# Patient Record
Sex: MALE | Race: BLACK OR AFRICAN AMERICAN | NOT HISPANIC OR LATINO | Employment: OTHER | URBAN - METROPOLITAN AREA
[De-identification: names, ages, dates, MRNs, and addresses within clinical notes are randomized per-mention and may not be internally consistent; named-entity substitution may affect disease eponyms.]

---

## 2020-05-22 RX ORDER — CARVEDILOL 25 MG/1
25 TABLET ORAL 2 TIMES DAILY
Qty: 180 TABLET | Refills: 3 | Status: CANCELLED | OUTPATIENT
Start: 2020-05-22

## 2020-08-31 ENCOUNTER — OFFICE VISIT (OUTPATIENT)
Dept: INTERNAL MEDICINE | Facility: CLINIC | Age: 65
End: 2020-08-31
Payer: COMMERCIAL

## 2020-08-31 VITALS
DIASTOLIC BLOOD PRESSURE: 86 MMHG | TEMPERATURE: 99 F | BODY MASS INDEX: 26.57 KG/M2 | HEIGHT: 70 IN | SYSTOLIC BLOOD PRESSURE: 135 MMHG | WEIGHT: 185.63 LBS | HEART RATE: 68 BPM

## 2020-08-31 DIAGNOSIS — R73.03 PREDIABETES: ICD-10-CM

## 2020-08-31 DIAGNOSIS — N40.1 BENIGN PROSTATIC HYPERPLASIA WITH URINARY OBSTRUCTION: ICD-10-CM

## 2020-08-31 DIAGNOSIS — E55.9 VITAMIN D DEFICIENCY, UNSPECIFIED: ICD-10-CM

## 2020-08-31 DIAGNOSIS — N52.9 ED (ERECTILE DYSFUNCTION) OF ORGANIC ORIGIN: ICD-10-CM

## 2020-08-31 DIAGNOSIS — M15.9 POLYARTICULAR OSTEOARTHRITIS: ICD-10-CM

## 2020-08-31 DIAGNOSIS — K58.8 OTHER IRRITABLE BOWEL SYNDROME: ICD-10-CM

## 2020-08-31 DIAGNOSIS — K21.9 GASTRO-ESOPHAGEAL REFLUX DISEASE WITHOUT ESOPHAGITIS: ICD-10-CM

## 2020-08-31 DIAGNOSIS — E29.1 TESTICULAR HYPOFUNCTION: ICD-10-CM

## 2020-08-31 DIAGNOSIS — E78.2 MIXED HYPERLIPIDEMIA: ICD-10-CM

## 2020-08-31 DIAGNOSIS — N13.8 BENIGN PROSTATIC HYPERPLASIA WITH URINARY OBSTRUCTION: ICD-10-CM

## 2020-08-31 DIAGNOSIS — R97.20 ELEVATED PROSTATE SPECIFIC ANTIGEN (PSA): ICD-10-CM

## 2020-08-31 DIAGNOSIS — D63.8 ANEMIA IN OTHER CHRONIC DISEASES CLASSIFIED ELSEWHERE: ICD-10-CM

## 2020-08-31 DIAGNOSIS — I69.254: ICD-10-CM

## 2020-08-31 DIAGNOSIS — I12.9 HYPERTENSIVE CHRONIC KIDNEY DISEASE WITH STAGE 1 THROUGH STAGE 4 CHRONIC KIDNEY DISEASE, OR UNSPECIFIED CHRONIC KIDNEY DISEASE: Primary | ICD-10-CM

## 2020-08-31 DIAGNOSIS — N18.2 CHRONIC KIDNEY DISEASE, STAGE 2 (MILD): ICD-10-CM

## 2020-08-31 PROCEDURE — 99215 OFFICE O/P EST HI 40 MIN: CPT | Mod: S$GLB,,, | Performed by: INTERNAL MEDICINE

## 2020-08-31 PROCEDURE — 99215 PR OFFICE/OUTPT VISIT, EST, LEVL V, 40-54 MIN: ICD-10-PCS | Mod: S$GLB,,, | Performed by: INTERNAL MEDICINE

## 2020-08-31 RX ORDER — CLOPIDOGREL BISULFATE 75 MG/1
TABLET ORAL
COMMUNITY
Start: 2020-06-10 | End: 2020-09-14

## 2020-08-31 RX ORDER — MELOXICAM 7.5 MG/1
TABLET ORAL
COMMUNITY
Start: 2020-07-24

## 2020-08-31 RX ORDER — TAMSULOSIN HYDROCHLORIDE 0.4 MG/1
CAPSULE ORAL
COMMUNITY
Start: 2020-07-26

## 2020-08-31 RX ORDER — ERGOCALCIFEROL 1.25 MG/1
CAPSULE ORAL
COMMUNITY
Start: 2020-07-17

## 2020-08-31 RX ORDER — SILDENAFIL 100 MG/1
TABLET, FILM COATED ORAL
COMMUNITY
Start: 2020-08-19

## 2020-08-31 NOTE — PROGRESS NOTES
Chief C/o:    Chronic Kidney Disease (Lab results check.), Hypertension, Hyperlipidemia, and Hypokalemia        Health Care Maintenance    Health Maintenance       Date Due Completion Date    Hepatitis C Screening 1955 ---    TETANUS VACCINE 07/01/1972 ---    Shingles Vaccine (1 of 2) 07/01/2004 ---    Pneumococcal Vaccine (65+ Low/Medium Risk) (2 of 2 - PPSV23) 07/01/2019 7/18/2012    Influenza Vaccine (1) 09/01/2020 ---    Colorectal Cancer Screening 01/15/2025 1/15/2015 (Done)    Override on 1/15/2015: Done    Lipid Panel 04/28/2025 4/28/2020                 HISTORY OF PRESENT ILLNESS:    JADE Horn is a 65 y.o. male who has a past medical history significant for hypertension with chronic kidney disease stage 2 at baseline, he has mixed hyperlipidemia, vitamin-D deficiency, prediabetes, gastroesophageal reflux disease, and other medical problem as will be listed in the final diagnosis.  Patient was not seen and about 5 months or more, essentially because of the COVID-19 epidemic, he was doing very well, he did have any problem nor anybody of his immediate family.    Patient feels well in general, he does not have chest pain, shortness of breath, nausea or vomiting, fever or chills.  He has no problem with his medications as well.    He had right foot pain and it in the Emergency on July 24, 2020, it was [facility], the records were reviewed, he had an x-ray at that time, the x-ray revealed no sign of obvious bony deformity, there was mild underlying arthritis changes.  Since then is the pain in the right foot was resolved..    He was also seen by his urologist on August 26th 2020, his problem with his prostate, BPH with obstruction, nocturia, incomplete bladder emptying.  History me that the urologist suggested how surgery, however he is at this time  It is still thinking about it, he feels he is doing well at his current situation and that his intention is to wait for now and may  consider it in the future.          ALLERGIES AND MEDICATIONS: updated and reviewed.  Review of patient's allergies indicates:   Allergen Reactions    Ace inhibitors Swelling    Gabapentin      Medication List with Changes/Refills   Current Medications    AMLODIPINE (NORVASC) 10 MG TABLET    TAKE 1 TABLET BY MOUTH EVERY DAY, REPLACING 5 MG( HOLD FOR SYSTOLIC BLOOD PRESSURE< 120)    ATORVASTATIN (LIPITOR) 20 MG TABLET        CARVEDILOL (COREG) 25 MG TABLET        CLOPIDOGREL (PLAVIX) 75 MG TABLET        ERGOCALCIFEROL (ERGOCALCIFEROL) 50,000 UNIT CAP    TK ONE C PO ONCE A MONTH    HYDRALAZINE (APRESOLINE) 50 MG TABLET        MELOXICAM (MOBIC) 7.5 MG TABLET    TK 1 T PO D PRF PAIN    POTASSIUM CHLORIDE (K-TAB) 20 MEQ    TAKE 1 TABLET BY MOUTH TWICE DAILY AS OF 11/14/18    SILDENAFIL (VIAGRA) 100 MG TABLET    Take 1 tablet by mouth as needed for Erectile Dysfunction    TAMSULOSIN (FLOMAX) 0.4 MG CAP    TK 1 C PO Q NIGHT   Discontinued Medications    AMLODIPINE (NORVASC) 5 MG TABLET        BETHANECHOL (URECHOLINE) 50 MG TABLET    Take 1 tablet (50 mg total) by mouth 3 (three) times daily.    DOXAZOSIN (CARDURA) 4 MG TABLET    Take 1 tablet (4 mg total) by mouth every evening.    FINASTERIDE (PROSCAR) 5 MG TABLET    Take 1 tablet (5 mg total) by mouth once daily.    HYOSCYAMINE (LEVSIN/SL) 0.125 MG SUBL        L-[id number] 3-35-2 MG TAB        POTASSIUM CHLORIDE SA (K-DUR,KLOR-CON) 20 MEQ TABLET        TADALAFIL (CIALIS) 20 MG TAB    Use one tablet every 36 hours    TRAMADOL (ULTRAM) 50 MG TABLET                 CARE TEAM:    Patient Care Team:  Marcela Walls MD as PCP - General (Internal Medicine)         REVIEW OF SYSTEMS:    Review of Systems   Constitutional: Negative for appetite change, chills, diaphoresis, fatigue, fever and unexpected weight change.   HENT: Negative for congestion, drooling, ear discharge, ear pain, facial swelling, hearing loss, nosebleeds, rhinorrhea, sinus pain, sneezing, sore  "throat, tinnitus, trouble swallowing and voice change.    Eyes: Negative for pain, discharge, redness, itching and visual disturbance.   Respiratory: Negative for cough, choking, chest tightness, shortness of breath, wheezing and stridor.    Cardiovascular: Negative for chest pain, palpitations and leg swelling.   Gastrointestinal: Negative for abdominal distention, abdominal pain, blood in stool, constipation, diarrhea, nausea and vomiting.   Endocrine: Negative for cold intolerance, heat intolerance, polydipsia, polyphagia and polyuria.   Genitourinary: Negative for difficulty urinating, dysuria, flank pain, frequency, hematuria and urgency.   Musculoskeletal: Negative for arthralgias, back pain, gait problem, joint swelling, myalgias, neck pain and neck stiffness.        Patient denies any joint pains at this time.   Skin: Negative for color change, pallor, rash and wound.   Allergic/Immunologic: Negative for environmental allergies, food allergies and immunocompromised state.   Neurological: Positive for weakness (There is a very mild weakness in the left upper and lower extremities). Negative for dizziness, tremors, seizures, syncope, speech difficulty, light-headedness, numbness and headaches.   Hematological: Negative for adenopathy. Does not bruise/bleed easily.   Psychiatric/Behavioral: Negative for agitation, behavioral problems, confusion, decreased concentration, dysphoric mood, hallucinations, sleep disturbance and suicidal ideas. The patient is not nervous/anxious.          PHYSICAL EXAM:    Vitals:    08/31/20 0839   BP: 135/86   Pulse: 68   Temp: 98.5 °F (36.9 °C)     Weight: 84.2 kg (185 lb 10 oz)   Height: 5' 10" (177.8 cm)   Body mass index is 26.63 kg/m².  Vitals:    08/31/20 0839   BP: 135/86   Pulse: 68   Temp: 98.5 °F (36.9 °C)   TempSrc: Temporal   Weight: 84.2 kg (185 lb 10 oz)   Height: 5' 10" (1.778 m)   PainSc: 0-No pain          Physical Exam   Constitutional: He is oriented to person, " place, and time. He appears well-developed and well-nourished.  Non-toxic appearance. He does not appear ill. No distress.   Overweight, cooperative, comfortable, in no gross distress.   HENT:   Head: Normocephalic and atraumatic.   Right Ear: Tympanic membrane, external ear and ear canal normal.   Left Ear: Tympanic membrane, external ear and ear canal normal.   Nose: Nose normal. No rhinorrhea or nasal congestion.   Mouth/Throat: Oropharynx is clear and moist. Mucous membranes are moist. No oropharyngeal exudate or posterior oropharyngeal erythema. Oropharynx is clear.   Eyes: Pupils are equal, round, and reactive to light. Conjunctivae are normal. Right eye exhibits no discharge. Left eye exhibits no discharge. No scleral icterus.   Neck: Normal range of motion. Neck supple. No JVD present. No muscular tenderness present. No neck rigidity. No tracheal deviation present. No thyromegaly present.   Cardiovascular: Normal rate, regular rhythm, normal heart sounds and normal pulses. Exam reveals no gallop and no friction rub.   No murmur heard.  Pulmonary/Chest: Effort normal and breath sounds normal. No stridor. No respiratory distress. He has no wheezes. He has no rhonchi. He has no rales. He exhibits no tenderness.   Abdominal: Soft. Bowel sounds are normal. He exhibits no distension and no mass. There is no abdominal tenderness. There is no rebound and no guarding. No hernia.   Genitourinary:    Genitourinary Comments: No costovertebral angle tenderness.     Musculoskeletal: Normal range of motion.         General: No swelling, tenderness, deformity or signs of injury.      Right lower leg: Edema (Swelling pitting edema bilaterally, he has compression stockings on as well.) present.      Left lower leg: Edema present.   Lymphadenopathy:     He has no cervical adenopathy.   Neurological: He is alert and oriented to person, place, and time. He displays weakness (Mild weakness left upper and lower extremities). He  displays normal reflexes. No cranial nerve deficit or sensory deficit. He exhibits normal muscle tone. Gait abnormal. Coordination normal.   Mild weakness of left upper and lower extremities, with mildly abnormal gait because of left leg weakness   Skin: Skin is warm and dry. Capillary refill takes less than 2 seconds. No bruising, no lesion and no rash noted. He is not diaphoretic. No erythema. No jaundice or pallor.   Psychiatric: His behavior is normal. Mood, judgment and thought content normal.   Nursing note and vitals reviewed.         Labs:  Latest labs 4/28/2020, at [facility] were reciwed and discussed with patient.  Lab Results   Component Value Date    HGBA1C 5.6 04/28/2020    ESTIMATEDAVG 114 04/28/2020          ASSESSMENT & PLAN:    1. Hypertensive chronic kidney disease with stage 1 through stage 4 chronic kidney disease.  Blood pressure is not optimally controlled but accepted level at this time, and will keep patient on his current medication including amlodipine 10 mg a day, carvedilol 25 mg twice a day and hydralazine mg tablets.  General measures: low salt, low fat, vegetables and fruits rich diet. Check BP before taking BP medications and follow precautions and guidelines. Keep a records of your BP and Pulse readings and bring the chart to the office next visit. If BP is consistently above 130/80, I recommend that you book a sooner appointment to address the issue.    3. Mixed hyperlipidemia  Lipids were fairly well controlled last checked.  Continue on Atorvastatin 20 mg QHS, plus diet and exercise and weight loss.    4. Testicular hypofunction    5. Vitamin D deficiency, unspecified  On 2000 unites Vit D3 po / OTC. Continue same dose.  6. Prediabetes  Patient's blood sugar is in the range of prediabetes, as well as the hemoglobin A1c.  Diet, exercise and weight loss were encouraged to prevent progression to full-blown diabetes mellitus    7. Gastro-esophageal reflux disease without  esophagitis  Asymptomatic currently.  8. Benign prostatic hyperplasia with urinary obstruction  On flomax, continue same.  Still has symptoms, but he is managing well.  Seeing Urologist, as mentioned above.  9. Other irritable bowel syndrome  Asymptomatic currently.  10. 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  11. Anemia in other chronic diseases classified elsewhere  Mild, stable.  12. ED (erectile dysfunction) of organic origin    13. BMI 26.0-26.9,adult  Low-fat diet, increase vegetables, learn how to count calories, check weight every morning and keep a diet and weight diary.  Bring the diary next visit.  Try to identify one specific food item or habit that you can improve, try to stick to it and add another item after 2-4 weeks interval. If you are not improving as you wish, bring your food diary and we will try, together, find something specific that we can work on.    14. Elevated prostate specific antigen (PSA)  Continue FU with Urologist, no clinical evidence of cancer.  15. Polyarticular osteoarthritis   asymptomatic currently.      Patient is managing very well in general, he will need BL tests before next visit, however, his insurance will be changed, he will contact us before his next appoitment for blood tests orders.      No orders of the defined types were placed in this encounter.     Follow up in about 3 months (around 11/30/2020). or sooner as needed.    Patient was counseled and questions and concerns were addressed.    Please note:  Parts of this report were done using a dictation software, voice to text, and sometimes the text contains some uncorrected words or sentences that are missed during revision.

## 2020-09-14 ENCOUNTER — OFFICE VISIT (OUTPATIENT)
Dept: INTERNAL MEDICINE | Facility: CLINIC | Age: 65
End: 2020-09-14
Payer: MEDICARE

## 2020-09-14 DIAGNOSIS — I12.9 HYPERTENSIVE CHRONIC KIDNEY DISEASE WITH STAGE 1 THROUGH STAGE 4 CHRONIC KIDNEY DISEASE, OR UNSPECIFIED CHRONIC KIDNEY DISEASE: Primary | ICD-10-CM

## 2020-09-14 PROCEDURE — 90694 VACC AIIV4 NO PRSRV 0.5ML IM: CPT | Mod: S$GLB,,, | Performed by: INTERNAL MEDICINE

## 2020-09-14 PROCEDURE — G0008 ADMIN INFLUENZA VIRUS VAC: HCPCS | Mod: S$GLB,,, | Performed by: INTERNAL MEDICINE

## 2020-09-14 PROCEDURE — 90694 FLU VACCINE - QUADRIVALENT - ADJUVANTED: ICD-10-PCS | Mod: S$GLB,,, | Performed by: INTERNAL MEDICINE

## 2020-09-14 PROCEDURE — 99499 NO LOS: ICD-10-PCS | Mod: S$GLB,,, | Performed by: INTERNAL MEDICINE

## 2020-09-14 PROCEDURE — G0008 PR ADMIN INFLUENZA VIRUS VAC: ICD-10-PCS | Mod: S$GLB,,, | Performed by: INTERNAL MEDICINE

## 2020-09-14 PROCEDURE — 99499 UNLISTED E&M SERVICE: CPT | Mod: S$GLB,,, | Performed by: INTERNAL MEDICINE

## 2020-11-09 ENCOUNTER — OFFICE VISIT (OUTPATIENT)
Dept: INTERNAL MEDICINE | Facility: CLINIC | Age: 65
End: 2020-11-09
Payer: MEDICARE

## 2020-11-09 VITALS
WEIGHT: 186.19 LBS | SYSTOLIC BLOOD PRESSURE: 132 MMHG | DIASTOLIC BLOOD PRESSURE: 82 MMHG | BODY MASS INDEX: 26.65 KG/M2 | HEART RATE: 60 BPM | TEMPERATURE: 99 F | HEIGHT: 70 IN

## 2020-11-09 DIAGNOSIS — K21.9 GASTRO-ESOPHAGEAL REFLUX DISEASE WITHOUT ESOPHAGITIS: ICD-10-CM

## 2020-11-09 DIAGNOSIS — I69.359 HISTORY OF HEMORRHAGIC STROKE WITH RESIDUAL HEMIPARESIS: ICD-10-CM

## 2020-11-09 DIAGNOSIS — E29.1 TESTICULAR HYPOFUNCTION: ICD-10-CM

## 2020-11-09 DIAGNOSIS — N13.8 BENIGN PROSTATIC HYPERPLASIA WITH URINARY OBSTRUCTION: ICD-10-CM

## 2020-11-09 DIAGNOSIS — I69.254: ICD-10-CM

## 2020-11-09 DIAGNOSIS — M15.9 POLYARTICULAR OSTEOARTHRITIS: ICD-10-CM

## 2020-11-09 DIAGNOSIS — D63.8 ANEMIA IN OTHER CHRONIC DISEASES CLASSIFIED ELSEWHERE: ICD-10-CM

## 2020-11-09 DIAGNOSIS — N40.1 BENIGN PROSTATIC HYPERPLASIA WITH URINARY OBSTRUCTION: ICD-10-CM

## 2020-11-09 DIAGNOSIS — N18.2 CHRONIC KIDNEY DISEASE, STAGE 2 (MILD): ICD-10-CM

## 2020-11-09 DIAGNOSIS — I12.9 HYPERTENSIVE CHRONIC KIDNEY DISEASE WITH STAGE 1 THROUGH STAGE 4 CHRONIC KIDNEY DISEASE, OR UNSPECIFIED CHRONIC KIDNEY DISEASE: Primary | ICD-10-CM

## 2020-11-09 DIAGNOSIS — E55.9 VITAMIN D DEFICIENCY, UNSPECIFIED: ICD-10-CM

## 2020-11-09 DIAGNOSIS — E78.2 MIXED HYPERLIPIDEMIA: ICD-10-CM

## 2020-11-09 DIAGNOSIS — R73.03 PREDIABETES: ICD-10-CM

## 2020-11-09 DIAGNOSIS — R97.20 ELEVATED PROSTATE SPECIFIC ANTIGEN (PSA): ICD-10-CM

## 2020-11-09 PROCEDURE — 99214 OFFICE O/P EST MOD 30 MIN: CPT | Mod: S$GLB,,, | Performed by: INTERNAL MEDICINE

## 2020-11-09 PROCEDURE — 99214 PR OFFICE/OUTPT VISIT, EST, LEVL IV, 30-39 MIN: ICD-10-PCS | Mod: S$GLB,,, | Performed by: INTERNAL MEDICINE

## 2020-11-09 RX ORDER — FOLIC ACID 1 MG/1
1 TABLET ORAL DAILY
Qty: 90 TABLET | Refills: 3 | Status: SHIPPED | OUTPATIENT
Start: 2020-11-09 | End: 2021-11-09

## 2020-11-09 RX ORDER — FERROUS SULFATE 325(65) MG
325 TABLET ORAL
Qty: 90 TABLET | Refills: 2 | Status: SHIPPED | OUTPATIENT
Start: 2020-11-09

## 2020-11-09 NOTE — ACP (ADVANCE CARE PLANNING)
"  Advance Care Planning/ ACP was discussed with patient face-to-face.    I initiated the discussion with this patient about ADVANCE CARE PLANNING/ ACP.   The patient was not accompanied by any family member or friend.  The concept of Advance Care Planning/ACP was explained, and patient reverberated understanding.  The patient was informed that participation in this discussion about ACP is absolutely voluntary, and is not mandatory.    Topics explains and discussed: Advance Directive/ " Medical Living Will", Health Care Proxy/ "Health Care Durable Power of , and Do Not Resuscitate/ Do not Intubate.   A package of Educational  material, Forms, and Templates, was given to the patient, as well as online resources addresses.  Patient was informed that 'patients have the right to change their minds at any time".  Patients can call or come in person to the office for help and/ or for questions that may arise. And in case hel is needed for filling any of the forms.    At the end of today's visit, patient:       -will discuss ACP with his wife, he is not sure if he had an advanced directive in the past or not, if he has 1 he will bring it to us.    The Education material that was given to the patient is including:  [place] ADVANCE DIRECTIVE PLANNING FOR HEALTH CARE DECISIONS.      Time spent was less than 30 minutes, about 15 minutes.      "

## 2020-11-09 NOTE — PROGRESS NOTES
Chief C/o:    Chronic Kidney Disease (Lab results check.), Hyperlipidemia, Pre-diabetes, and Vitamin D Deficiency        Health Care Maintenance    Health Maintenance       Date Due Completion Date    Hepatitis C Screening 1955 ---    TETANUS VACCINE 07/01/1972 ---    Shingles Vaccine (1 of 2) 07/01/2004 ---    Pneumococcal Vaccine (65+ Low/Medium Risk) (2 of 2 - PPSV23) 07/01/2019 7/18/2012    Colorectal Cancer Screening 01/15/2025 1/15/2015 (Done)    Override on 1/15/2015: Done    Lipid Panel 04/28/2025 4/28/2020                 HISTORY OF PRESENT ILLNESS:    JADE Horn is a 65 y.o. male who presents to the clinic today for Chronic Kidney Disease (Lab results check.), Hyperlipidemia, Pre-diabetes, and Vitamin D Deficiency  .  Patient is feeling well in general, he has no chest pain, no shortness of breath, no nausea, no fever, no chills.  Patient seen urologist on 08/26/2001 who recommended that patient continue Flomax 0.4 mg as well as Viagra 100 mg p.o. p.r.n..  And arrange for follow-up in 6 months.            ALLERGIES AND MEDICATIONS: updated and reviewed.  Review of patient's allergies indicates:   Allergen Reactions    Ace inhibitors Swelling    Gabapentin      Medication List with Changes/Refills   New Medications    FERROUS SULFATE (FEOSOL) 325 MG (65 MG IRON) TAB TABLET    Take 1 tablet (325 mg total) by mouth daily with breakfast.    FOLIC ACID (FOLVITE) 1 MG TABLET    Take 1 tablet (1 mg total) by mouth once daily.   Current Medications    AMLODIPINE (NORVASC) 10 MG TABLET    TAKE 1 TABLET BY MOUTH EVERY DAY, REPLACING 5 MG( HOLD FOR SYSTOLIC BLOOD PRESSURE< 120)    ATORVASTATIN (LIPITOR) 20 MG TABLET        CARVEDILOL (COREG) 25 MG TABLET        CLOPIDOGREL (PLAVIX) 75 MG TABLET    TAKE 1 TABLET(75 MG) BY MOUTH DAILY    ERGOCALCIFEROL (ERGOCALCIFEROL) 50,000 UNIT CAP    TK ONE C PO ONCE A MONTH    HYDRALAZINE (APRESOLINE) 50 MG TABLET        MELOXICAM (MOBIC) 7.5 MG TABLET    TK 1 T  PO D PRF PAIN    POTASSIUM CHLORIDE (K-TAB) 20 MEQ    TAKE 1 TABLET BY MOUTH TWICE DAILY AS OF 11/14/18    SILDENAFIL (VIAGRA) 100 MG TABLET    Take 1 tablet by mouth as needed for Erectile Dysfunction    TAMSULOSIN (FLOMAX) 0.4 MG CAP    TK 1 C PO Q NIGHT             CARE TEAM:    Patient Care Team:  Marcela Walls MD as PCP - General (Internal Medicine)         REVIEW OF SYSTEMS:    Review of Systems   Constitutional: Negative for appetite change, chills, diaphoresis, fatigue, fever and unexpected weight change.   HENT: Negative for congestion, drooling, ear discharge, ear pain, facial swelling, hearing loss, nosebleeds, rhinorrhea, sinus pain, sneezing, sore throat, tinnitus, trouble swallowing and voice change.    Eyes: Negative for pain, discharge, redness, itching and visual disturbance.   Respiratory: Negative for cough, choking, chest tightness, shortness of breath, wheezing and stridor.    Cardiovascular: Negative for chest pain, palpitations and leg swelling.   Gastrointestinal: Negative for abdominal distention, abdominal pain, blood in stool, constipation, diarrhea, nausea and vomiting.   Endocrine: Negative for cold intolerance, heat intolerance, polydipsia, polyphagia and polyuria.   Genitourinary: Negative for difficulty urinating, dysuria, flank pain, frequency, hematuria and urgency.   Musculoskeletal: Negative for arthralgias, back pain, gait problem, joint swelling, myalgias, neck pain and neck stiffness.        Patient denies any joint pains at this time.   Skin: Negative for color change, pallor, rash and wound.   Allergic/Immunologic: Negative for environmental allergies, food allergies and immunocompromised state.   Neurological: Positive for weakness (There is a very mild weakness in the left upper and lower extremities). Negative for dizziness, tremors, seizures, syncope, speech difficulty, light-headedness, numbness and headaches.   Hematological: Negative for adenopathy. Does not  "bruise/bleed easily.   Psychiatric/Behavioral: Negative for agitation, behavioral problems, confusion, decreased concentration, dysphoric mood, hallucinations, sleep disturbance and suicidal ideas. The patient is not nervous/anxious.          PHYSICAL EXAM:    Vitals:    11/09/20 0819   BP: 132/82   Pulse: 60   Temp: 98.6 °F (37 °C)     Weight: 84.5 kg (186 lb 2.9 oz)   Height: 5' 10" (177.8 cm)   Body mass index is 26.71 kg/m².  Vitals:    11/09/20 0819   BP: 132/82   Pulse: 60   Temp: 98.6 °F (37 °C)   TempSrc: Temporal   Weight: 84.5 kg (186 lb 2.9 oz)   Height: 5' 10" (1.778 m)   PainSc: 0-No pain          Physical Exam   Constitutional: He is oriented to person, place, and time. He appears well-developed and well-nourished.  Non-toxic appearance. He does not appear ill. No distress.   Patient is overweight, cooperative, comfortable, in no gross distress.   HENT:   Head: Normocephalic and atraumatic.   Right Ear: Tympanic membrane, external ear and ear canal normal.   Left Ear: Tympanic membrane, external ear and ear canal normal.   Nose: Nose normal. No rhinorrhea or nasal congestion.   Mouth/Throat: Oropharynx is clear and moist. Mucous membranes are moist. No oropharyngeal exudate or posterior oropharyngeal erythema. Oropharynx is clear.   Eyes: Pupils are equal, round, and reactive to light. Conjunctivae are normal. Right eye exhibits no discharge. Left eye exhibits no discharge. No scleral icterus.   Neck: Normal range of motion. Neck supple. No JVD present. No muscular tenderness present. No neck rigidity. No tracheal deviation present. No thyromegaly present.   Cardiovascular: Normal rate, regular rhythm, normal heart sounds and normal pulses. Exam reveals no gallop and no friction rub.   No murmur heard.  Pulmonary/Chest: Effort normal and breath sounds normal. No stridor. No respiratory distress. He has no wheezes. He has no rhonchi. He has no rales. He exhibits no tenderness.   Abdominal: Soft. Normal " appearance and bowel sounds are normal. He exhibits no distension and no mass. There is no abdominal tenderness. There is no rebound and no guarding. No hernia.   Genitourinary:    Genitourinary Comments: No costovertebral angle tenderness.     Musculoskeletal: Normal range of motion.         General: No swelling, tenderness, deformity or signs of injury.      Right lower leg: Edema (+1 pitting edema bilaterally) present.      Left lower leg: Edema present.      Comments: Crepitation both knees   Lymphadenopathy:     He has no cervical adenopathy.   Neurological: He is alert and oriented to person, place, and time. He displays weakness (Mild weakness left upper and lower extremities). He displays normal reflexes. No cranial nerve deficit or sensory deficit. He exhibits normal muscle tone. Gait abnormal. Coordination normal.   Mild weakness of left upper and lower extremities, with mildly abnormal gait because of left leg weakness   Skin: Skin is warm and dry. Capillary refill takes less than 2 seconds. No bruising, no lesion and no rash noted. He is not diaphoretic. No erythema. No jaundice or pallor.   Psychiatric: His behavior is normal. Mood, judgment and thought content normal.   Nursing note and vitals reviewed.         Labs:  Discussed with patient      Lab Results   Component Value Date    HGBA1C 5.6 04/28/2020    ESTIMATEDAVG 114 04/28/2020      Hemoglobin A1c was 5.4% on 10/26/2020,   iron was 55 which is mildly low.    Ferritin was 11.4 ng per mL which is low.    ASSESSMENT & PLAN:    1. Hypertensive chronic kidney disease with stage 1 through stage 4 chronic kidney disease, or unspecified chronic kidney disease    2. Chronic kidney disease, stage 2 (mild)    3. Prediabetes    4. Mixed hyperlipidemia    5. Anemia in other chronic diseases classified elsewhere  - ferrous sulfate (FEOSOL) 325 mg (65 mg iron) Tab tablet; Take 1 tablet (325 mg total) by mouth daily with breakfast.  Dispense: 90 tablet; Refill:  2  - folic acid (FOLVITE) 1 MG tablet; Take 1 tablet (1 mg total) by mouth once daily.  Dispense: 90 tablet; Refill: 3    6. Hemiplegia of left nondominant side as late effect of other nontraumatic intracranial hemorrhage, unspecified hemiplegia type    7. History of hemorrhagic stroke with residual hemiparesis 2012    8. Benign prostatic hyperplasia with urinary obstruction    9. Elevated prostate specific antigen (PSA)    10. Testicular hypofunction    11. Vitamin D deficiency, unspecified    12. Gastro-esophageal reflux disease without esophagitis    13. BMI 26.0-26.9,adult    14. Polyarticular osteoarthritis     Patient is stable and as baseline currently, medication were reconciled and patient was advised to continue his current medication.  Diet exercise and weight management were encouraged as well.  Order for blood test was done.  Outside records from urologist were reviewed as well.        No orders of the defined types were placed in this encounter.     Follow up in about 1 month (around 12/9/2020). or sooner as needed.    Patient was counseled and questions and concerns were addressed.    Please note:  Parts of this report were done using a dictation software, voice to text, and sometimes the text contains some uncorrected words or sentences that are missed during revision.

## 2020-11-19 RX ORDER — CARVEDILOL 25 MG/1
25 TABLET ORAL 2 TIMES DAILY
Qty: 180 TABLET | Refills: 3 | Status: SHIPPED | OUTPATIENT
Start: 2020-11-19

## 2020-12-15 ENCOUNTER — OFFICE VISIT (OUTPATIENT)
Dept: INTERNAL MEDICINE | Facility: CLINIC | Age: 65
End: 2020-12-15
Payer: MEDICARE

## 2020-12-15 VITALS
HEIGHT: 70 IN | TEMPERATURE: 98 F | HEART RATE: 76 BPM | BODY MASS INDEX: 26.89 KG/M2 | WEIGHT: 187.81 LBS | SYSTOLIC BLOOD PRESSURE: 121 MMHG | DIASTOLIC BLOOD PRESSURE: 76 MMHG

## 2020-12-15 DIAGNOSIS — N13.8 BENIGN PROSTATIC HYPERPLASIA WITH URINARY OBSTRUCTION: ICD-10-CM

## 2020-12-15 DIAGNOSIS — I69.254: ICD-10-CM

## 2020-12-15 DIAGNOSIS — R97.20 ELEVATED PROSTATE SPECIFIC ANTIGEN (PSA): ICD-10-CM

## 2020-12-15 DIAGNOSIS — D63.8 ANEMIA IN OTHER CHRONIC DISEASES CLASSIFIED ELSEWHERE: ICD-10-CM

## 2020-12-15 DIAGNOSIS — N40.1 BENIGN PROSTATIC HYPERPLASIA WITH URINARY OBSTRUCTION: ICD-10-CM

## 2020-12-15 DIAGNOSIS — N18.2 CHRONIC KIDNEY DISEASE, STAGE 2 (MILD): ICD-10-CM

## 2020-12-15 DIAGNOSIS — K21.9 GASTRO-ESOPHAGEAL REFLUX DISEASE WITHOUT ESOPHAGITIS: ICD-10-CM

## 2020-12-15 DIAGNOSIS — E55.9 VITAMIN D DEFICIENCY, UNSPECIFIED: ICD-10-CM

## 2020-12-15 DIAGNOSIS — N52.9 ED (ERECTILE DYSFUNCTION) OF ORGANIC ORIGIN: ICD-10-CM

## 2020-12-15 DIAGNOSIS — E87.6 HYPOKALEMIA: ICD-10-CM

## 2020-12-15 DIAGNOSIS — R33.9 INCOMPLETE BLADDER EMPTYING: ICD-10-CM

## 2020-12-15 DIAGNOSIS — E78.2 MIXED HYPERLIPIDEMIA: ICD-10-CM

## 2020-12-15 DIAGNOSIS — R35.1 NOCTURIA: ICD-10-CM

## 2020-12-15 DIAGNOSIS — E29.1 TESTICULAR HYPOFUNCTION: ICD-10-CM

## 2020-12-15 DIAGNOSIS — D72.819 LEUKOPENIA, UNSPECIFIED TYPE: ICD-10-CM

## 2020-12-15 DIAGNOSIS — I12.9 HYPERTENSIVE CHRONIC KIDNEY DISEASE WITH STAGE 1 THROUGH STAGE 4 CHRONIC KIDNEY DISEASE, OR UNSPECIFIED CHRONIC KIDNEY DISEASE: Primary | ICD-10-CM

## 2020-12-15 DIAGNOSIS — M15.9 POLYARTICULAR OSTEOARTHRITIS: ICD-10-CM

## 2020-12-15 DIAGNOSIS — K58.8 OTHER IRRITABLE BOWEL SYNDROME: ICD-10-CM

## 2020-12-15 DIAGNOSIS — I69.359 HISTORY OF HEMORRHAGIC STROKE WITH RESIDUAL HEMIPARESIS: ICD-10-CM

## 2020-12-15 DIAGNOSIS — R73.03 PREDIABETES: ICD-10-CM

## 2020-12-15 PROCEDURE — G0402 PR WELCOME MEDICARE PREVENTIVE VISIT NEW ENROLLEE: ICD-10-PCS | Mod: S$GLB,,, | Performed by: INTERNAL MEDICINE

## 2020-12-15 PROCEDURE — 99497 PR ADVNCD CARE PLAN 30 MIN: ICD-10-PCS | Mod: 25,S$GLB,, | Performed by: INTERNAL MEDICINE

## 2020-12-15 PROCEDURE — 99213 OFFICE O/P EST LOW 20 MIN: CPT | Mod: 25,S$GLB,, | Performed by: INTERNAL MEDICINE

## 2020-12-15 PROCEDURE — G0402 INITIAL PREVENTIVE EXAM: HCPCS | Mod: S$GLB,,, | Performed by: INTERNAL MEDICINE

## 2020-12-15 PROCEDURE — 99497 ADVNCD CARE PLAN 30 MIN: CPT | Mod: 25,S$GLB,, | Performed by: INTERNAL MEDICINE

## 2020-12-15 PROCEDURE — 99213 PR OFFICE/OUTPT VISIT, EST, LEVL III, 20-29 MIN: ICD-10-PCS | Mod: 25,S$GLB,, | Performed by: INTERNAL MEDICINE

## 2020-12-15 NOTE — ACP (ADVANCE CARE PLANNING)
"  Advance Care Planning/ ACP was discussed with patient face-to-face.    I initiated the discussion with this patient about ADVANCE CARE PLANNING/ ACP.   The patient was not accompanied by any family member or friend.  The concept of Advance Care Planning/ACP was explained, and patient reverberated understanding.  The patient was informed that participation in this discussion about ACP is absolutely voluntary, and is not mandatory.    Topics explains and discussed: Advance Directive/ " Medical Living Will", Health Care Proxy/ "Health Care Durable Power of , and Do Not Resuscitate/ Do not Intubate.   A package of Educational  material, Forms, and Templates, was given to the patient, as well as online resources addresses.  Patient was informed that 'patients have the right to change their minds at any time".  Patients can call or come in person to the office for help and/ or for questions that may arise. And in case hel is needed for filling any of the forms.    At the end of today's visit, patient:     Patient will discuss advance care planning with his wife, and if he completed form, he will bring it to us have a copy in his file.    The Education material that was given to the patient is including:  [place] ADVANCE DIRECTIVE PLANNING FOR HEALTH CARE DECISIONS.      Time spent was less than 30 minutes, pulse 20 minutes.      "

## 2020-12-15 NOTE — PROGRESS NOTES
Chief C/o:    Chronic Kidney Disease (Lab results check.), Hyperlipidemia, Pre-diabetes, and Vitamin D Deficiency        Health Care Maintenance    Health Maintenance       Date Due Completion Date    Hepatitis C Screening 1955 ---    TETANUS VACCINE 07/01/1972 ---    Shingles Vaccine (1 of 2) 07/01/2004 ---    Pneumococcal Vaccine (65+ Low/Medium Risk) (2 of 2 - PPSV23) 07/01/2019 7/18/2012    High Dose Statin 11/09/2021 11/9/2020    Colorectal Cancer Screening 01/15/2025 1/15/2015 (Done)    Override on 1/15/2015: Done    Lipid Panel 04/28/2025 4/28/2020                 HISTORY OF PRESENT ILLNESS:    JADE Horn is a 65 y.o. male who presents to the clinic today for Chronic Kidney Disease (Lab results check.), Hyperlipidemia, Pre-diabetes, and Vitamin D Deficiency  .   Patient feels well in general, with no chest pain, no shortness of breath, no nausea, no fever, no chills.  Patient has no side effects of significance with current medications.                  ALLERGIES AND MEDICATIONS: updated and reviewed.  Review of patient's allergies indicates:   Allergen Reactions    Ace inhibitors Swelling    Gabapentin      Medication List with Changes/Refills   Current Medications    AMLODIPINE (NORVASC) 10 MG TABLET    TAKE 1 TABLET BY MOUTH EVERY DAY, REPLACING 5 MG( HOLD FOR SYSTOLIC BLOOD PRESSURE< 120)    ATORVASTATIN (LIPITOR) 20 MG TABLET        CARVEDILOL (COREG) 25 MG TABLET    Take 1 tablet (25 mg total) by mouth 2 (two) times daily.    CLOPIDOGREL (PLAVIX) 75 MG TABLET    TAKE 1 TABLET(75 MG) BY MOUTH DAILY    ERGOCALCIFEROL (ERGOCALCIFEROL) 50,000 UNIT CAP    TK ONE C PO ONCE A MONTH    FERROUS SULFATE (FEOSOL) 325 MG (65 MG IRON) TAB TABLET    Take 1 tablet (325 mg total) by mouth daily with breakfast.    FOLIC ACID (FOLVITE) 1 MG TABLET    Take 1 tablet (1 mg total) by mouth once daily.    HYDRALAZINE (APRESOLINE) 50 MG TABLET        MELOXICAM (MOBIC) 7.5 MG TABLET    TK 1 T PO D PRF PAIN     POTASSIUM CHLORIDE (K-TAB) 20 MEQ    TAKE 1 TABLET BY MOUTH TWICE DAILY AS OF 11/14/18    SILDENAFIL (VIAGRA) 100 MG TABLET    Take 1 tablet by mouth as needed for Erectile Dysfunction    TAMSULOSIN (FLOMAX) 0.4 MG CAP    TK 1 C PO Q NIGHT             CARE TEAM:    Patient Care Team:  Marcela Walls MD as PCP - General (Internal Medicine)         REVIEW OF SYSTEMS:    Review of Systems   Constitutional: Negative for appetite change, chills, diaphoresis, fatigue, fever and unexpected weight change.   HENT: Negative for congestion, drooling, ear discharge, ear pain, facial swelling, hearing loss, nosebleeds, rhinorrhea, sinus pain, sneezing, sore throat, tinnitus, trouble swallowing and voice change.    Eyes: Negative for pain, discharge, redness, itching and visual disturbance.   Respiratory: Negative for cough, choking, chest tightness, shortness of breath, wheezing and stridor.    Cardiovascular: Negative for chest pain, palpitations and leg swelling.   Gastrointestinal: Negative for abdominal distention, abdominal pain, blood in stool, constipation, diarrhea, nausea and vomiting.   Endocrine: Negative for cold intolerance, heat intolerance, polydipsia, polyphagia and polyuria.   Genitourinary: Negative for difficulty urinating, dysuria, flank pain, frequency, hematuria and urgency.   Musculoskeletal: Negative for arthralgias, back pain, gait problem, joint swelling, myalgias, neck pain and neck stiffness.        Patient denies any joint pains at this time.   Skin: Negative for color change, pallor, rash and wound.   Allergic/Immunologic: Negative for environmental allergies, food allergies and immunocompromised state.   Neurological: Positive for weakness (There is a very mild weakness in the left upper and lower extremities). Negative for dizziness, tremors, seizures, syncope, speech difficulty, light-headedness, numbness and headaches.   Hematological: Negative for adenopathy. Does not bruise/bleed  "easily.   Psychiatric/Behavioral: Negative for agitation, behavioral problems, confusion, decreased concentration, dysphoric mood, hallucinations, sleep disturbance and suicidal ideas. The patient is not nervous/anxious.          PHYSICAL EXAM:    Vitals:    12/15/20 0811   BP: 121/76   Pulse: 76   Temp: 98 °F (36.7 °C)     Weight: 85.2 kg (187 lb 13.3 oz)   Height: 5' 10" (177.8 cm)   Body mass index is 26.95 kg/m².  Vitals:    12/15/20 0811   BP: 121/76   Pulse: 76   Temp: 98 °F (36.7 °C)   TempSrc: Temporal   Weight: 85.2 kg (187 lb 13.3 oz)   Height: 5' 10" (1.778 m)   PainSc: 0-No pain          Physical Exam   Constitutional: He is oriented to person, place, and time. He appears well-developed and well-nourished.  Non-toxic appearance. He does not appear ill. No distress.   Patient is overweight, cooperative, comfortable, in no gross distress.   HENT:   Head: Normocephalic and atraumatic.   Right Ear: Tympanic membrane, external ear and ear canal normal.   Left Ear: Tympanic membrane, external ear and ear canal normal.   Nose: Nose normal. No rhinorrhea or nasal congestion.   Mouth/Throat: Oropharynx is clear and moist. Mucous membranes are moist. No oropharyngeal exudate or posterior oropharyngeal erythema. Oropharynx is clear.   Eyes: Pupils are equal, round, and reactive to light. Conjunctivae are normal. Right eye exhibits no discharge. Left eye exhibits no discharge. No scleral icterus.   Neck: Normal range of motion. Neck supple. No JVD present. No muscular tenderness present. No neck rigidity. No tracheal deviation present. No thyromegaly present.   Cardiovascular: Normal rate, regular rhythm, normal heart sounds and normal pulses. Exam reveals no gallop and no friction rub.   No murmur heard.  Pulmonary/Chest: Effort normal and breath sounds normal. No stridor. No respiratory distress. He has no wheezes. He has no rhonchi. He has no rales. He exhibits no tenderness.   Abdominal: Soft. Normal appearance " and bowel sounds are normal. He exhibits no distension and no mass. There is no abdominal tenderness. There is no rebound and no guarding. No hernia.   Genitourinary:    Genitourinary Comments: No costovertebral angle tenderness.     Musculoskeletal: Normal range of motion.         General: No swelling, tenderness, deformity or signs of injury.      Right lower leg: Edema (+1 pitting edema bilaterally) present.      Left lower leg: Edema present.      Comments: Crepitation both knees   Lymphadenopathy:     He has no cervical adenopathy.   Neurological: He is alert and oriented to person, place, and time. He displays weakness (Mild weakness left upper and lower extremities). He displays normal reflexes. No cranial nerve deficit or sensory deficit. He exhibits normal muscle tone. Gait abnormal. Coordination normal.   Mild weakness of left upper and lower extremities, with mildly abnormal gait because of left leg weakness   Skin: Skin is warm and dry. Capillary refill takes less than 2 seconds. No bruising, no lesion and no rash noted. He is not diaphoretic. No erythema. No jaundice or pallor.   Psychiatric: His behavior is normal. Mood, judgment and thought content normal.   Nursing note and vitals reviewed.         Labs:   patient had  His blood test at done at  on 12/09/2020.    His ferritin was decreased to 17.4, normal is  ng/mL.  Iron was decreased to 59, normal between  mcg/dl.   hepatitis-C was negative.    Potassium was decreased to 3.4.    Estimated GFR was 70.   HDL was 49, triglyceride 125, and LDL was 95. TSH was normal.    WBCs were decreased to 4.3 K and hemoglobin was mildly decreased at 12.4 with lower limit of normal at 13.     note a copy of patient's blood test is in the media section.    ASSESSMENT & PLAN:    1. Hypertensive chronic kidney disease with stage 1 through stage 4 chronic kidney disease, or unspecified chronic kidney disease   blood pressure is  well controlled, continue current medications in addition to diet exercise and weight loss.  General measures: low salt, low fat, vegetables and fruits rich diet. Check BP before taking BP medications and follow precautions and guidelines. Keep a records of your BP and Pulse readings and bring the chart to the office next visit. If BP is consistently above 130/80, I recommend that you book a sooner appointment to address the issue.  2. Chronic kidney disease, stage 2 (mild)    Mild chronic kidney disease, patient is stable over the past several years, he also sees nephrologist once a year.  3. Hypokalemia    Mild hypokalemia with potassium at 3.4, patient to continue on potassium chloride 20 mEq twice a day, will recheck his BMP, magnesium, and potassium in about 1 month, nonfasting.  4. Mixed hyperlipidemia    Fairly well controlled  5. 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 Prediabetes  Patient's blood sugar is in the range of prediabetes, as well as the hemoglobin A1c.  Diet, exercise and weight loss were encouraged to prevent progression to full-blown diabetes mellitus  7. Anemia in other chronic diseases classified elsewhere   on iron supplement, level of hemoglobin is consistent with mild anemia.  8. Benign prostatic hyperplasia with urinary obstruction    Sees urologist once a year continue current medications  9. Elevated prostate specific antigen (PSA)   as above  10. Nocturia    11. Incomplete bladder emptying    12. BMI 26.0-26.9,adult  Low-fat diet, increase vegetables, learn how to count calories, check weight every morning and keep a diet and weight diary.  Bring the diary next visit.  Try to identify one specific food item or habit that you can improve, try to stick to it and add another item after 2-4 weeks interval. If you are not improving as you wish, bring your food diary and we will try, together,  find something specific that we can work on.  13. ED (erectile dysfunction) of organic origin    14. Testicular hypofunction    15. Vitamin D deficiency, unspecified   continue on some blunt, vitamin-D 2.  16. Gastro-esophageal reflux disease without esophagitis   asymptomatic currently  17. Other irritable bowel syndrome    18. Polyarticular osteoarthritis    19. Leukopenia, unspecified type   no clinical implication at this time.  20. History of hemorrhagic stroke with residual hemiparesis 2012         Will see the patient again in about 1 month time after a blood test, non-fasting,  To check his BMP, potassium, magnesium, and phosphorous.     Note that a wellness visit was combined with this visit, and papers are scanned to media section.    Advance care planning was discussed with the patient at length, and patient is having intention to discuss it with his wife and will bring us a copy of his advanced directive if he completed it.    No orders of the defined types were placed in this encounter.     Follow up in about 1 month (around 1/15/2021). or sooner as needed.    Patient was counseled and questions and concerns were addressed.    Please note:  Parts of this report were done using a dictation software, voice to text, and sometimes the text contains some uncorrected words or sentences that are missed during revision.

## 2020-12-15 NOTE — PATIENT INSTRUCTIONS
Prevention Guidelines, Men Ages 65 and Older  Screening tests and vaccines are an important part of managing your health. Health counseling is essential, too. Below are guidelines for these, for men ages 65 and older. Talk with your healthcare provider to make sure youre up-to-date on what you need.  Screening Who needs it How often   Abdominal aortic aneurysm Men ages 65 to 75 who have ever smoked 1 ultrasound   Alcohol misuse All men in this age group At routine exams   Blood pressure All men in this age group Every 2 years if your blood pressure is less than 120/80 mm Hg; yearly if your systolic blood pressure is 120 to 139 mm Hg, or your diastolic blood pressure reading is 80 to 89 mm Hg   Colorectal cancer All men in this age group Flexible sigmoidoscopy every 5 years, or colonoscopy every 10 years, or double-contrast barium enema every 5 years; yearly fecal occult blood test or fecal immunochemical test; or a stool DNA test as often as your healthcare provider advises; talk with your healthcare provider about which tests are best for you and when you no longer need colonoscopies (generally after age 75)   Depression All men in this age group At routine exams   Type 2 diabetes or prediabetes All adults beginning at age 45 and adults without symptoms at any age who are overweight or obese and have 1 or more other risk factors for diabetes At least every 3 years (yearly if your blood sugar has already begun to rise)   Hepatitis C Men at increased risk for infection - talk with your healthcare provider At routine exams   High cholesterol or triglycerides All men in this age group At least every 5 years   HIV Men at increased risk for infection - talk with your healthcare provider At routine exams   Lung cancer Adults ages 55 to 80 who have smoked Yearly screening in smokers with 30 pack-year history of smoking or who quit within 15 years   Obesity All men in this age group At routine exams   Prostate cancer All  men in this age group, talk to healthcare provider about risks and benefits of digital rectal exam (LORNE) and prostate-specific antigen (PSA) screening1 At routine exams   Syphilis Men at increased risk for infection - talk with your healthcare provider At routine exams   Tuberculosis Men at increased risk for infection - talk with your healthcare provider Ask your healthcare provider   Vision All men in this age group Every 1 to 2 years; if you have a chronic health condition, ask your healthcare provider if you needs exams more often   Vaccine Who needs it How often   Chickenpox (varicella) All men in this age group who have no record of this infection or vaccine 2 doses; second dose should be given at least 4 weeks after the first dose   Hepatitis A Men at increased risk for infection - talk with your healthcare provider 2 doses given at least 6 months apart   Hepatitis B Men at increased risk for infection - talk with your healthcare provider 3 doses over 6 months; second dose should be given 1 month after the first dose; the third dose should be given at least 2 months after the second dose and at least 4 months after the first dose   Haemophilus influenzae Type B (HIB) Men at increased risk for infection - talk with your healthcare provider 1 to 3 doses   Influenza (flu) All men in this age group  Once a year   Meningococcal Men at increased risk for infection - talk with your healthcare provider 1 or more doses   Pneumococcal conjugate vaccine (PCV13) and pneumococcal polysaccharide vaccine (PPSV23) All men in this age group 1 dose of each vaccine   Tetanus/diphtheria/  pertussis (Td/Tdap) booster All men in this age group Td every 10 years, or Tdap if you will have contact with a child younger than 12 months old   Zoster All men in this age group 1 dose   Counseling Who needs it How often   Diet and exercise Men who are overweight or obese When diagnosed, and then at routine exams   Fall prevention (exercise,  vitamin D supplements) All men in this age group At routine exams   Sexually transmitted infection Men at increased risk for infection - talk with your healthcare provider At routine exams   Use of daily aspirin Men ages 45 to 79 at risk for cardiovascular health problems At routine exams   Use of tobacco and the health effects it can cause All men in this age group Every visit   [address] [place] [address] [facility]   Date Last Reviewed: 2/1/2017  © 5611-0542 [employer], [employer]. [address] [place] [address], [place] [address]. All rights reserved. This information is not intended as a substitute for professional medical care. Always follow your healthcare professional's instructions.        Understanding Advance Care Planning  Advance care planning is the process of deciding ones own future medical care. It helps to make sure that if you cant speak for yourself, your wishes can still be carried out. The plan is a series of legal documents that note a persons wishes. The documents vary by state. Advance care planning should be discussed at a regular office visit with your primary care provider before an acute illness. Advance care planning is encouraged when a person has a serious illness that is expected to get worse. It may also be done before major surgery. And it can help you and your family be prepared in case of a major illness or injury. Advance care planning helps with making decisions at these times.    A healthcare proxy is a person who acts as the voice of a patient when the patient cant speak for himself or herself. The name of this role varies by state. It may be called a Durable Medical Power of  or Durable Power of  for Healthcare. It may be called an agent, surrogate, or advocate. Or it may be called a representative or decision maker. It is an official duty that is identified by a legal document. The document also varies by state.   Why is advance care planning important?  If a  person communicates his or her healthcare wishes:  · He or she will be given medical care that matches his or her values and goals.  · Family members will not be forced to make decisions in a crisis with no guidance.  Creating a plan  Making an advance care plan is often done in 3 steps:  · Thinking about ones wishes. To create an advance care plan, you should think about what kind of medical treatment you would want if you lose the ability to communicate. Are there any situations in which you would refuse or stop treatment? Are there therapies you would want or not want? And whom do you want to make decisions for you? There are many places to learn more about how to plan for your care. Ask your healthcare provider or  for resources.  · Picking a healthcare proxy. This means choosing a trusted person to speak for you only when you cant speak for yourself. When you cannot make medical decisions, your proxy makes sure the instructions in your advance care plan are followed. A proxy does not make decisions based on his or her own opinions. They must put aside those opinions and values if needed, and carry out your wishes.  · Filling out the legal documents. There are several kinds of legal documents for advance care planning. Each one tells healthcare providers your wishes. The documents may vary by state. They must be signed and may need to be witnessed or notarized. You can cancel or change them whenever you wish. Depending on your state, the documents may include a Healthcare Proxy form, Living Will, Durable Medical Power of , Advance Directive, or others.  The familys role  The best help a family can give is to support their loved ones wishes. Open and honest communication is vital. Family should express any concerns they have about the patients choices while the patient can still make decisions in the event that his or her illness prevents him or her from communicating those wishes at a  later time.   Date Last Reviewed: 4/1/2017  © 3053-6125 [employer], [employer]. [address] [place] [address], [place] [address]. All rights reserved. This information is not intended as a substitute for professional medical care. Always follow your healthcare professional's instructions.

## 2020-12-15 NOTE — PROGRESS NOTES
"  Boris Horn presented for a follow-up Medicare AWV today. The following components were reviewed and updated:    · Medical history  · Family History  · Social history  · Allergies and Current Medications  · Health Risk Assessment  · Health Maintenance  · Care Team    **See Completed Assessments for Annual Wellness visit with in the encounter summary    The following assessments were completed:  · Depression Screening  · Cognitive function Screening  · Timed Get  Up Test  ·     Vitals:    12/15/20 0811   BP: 121/76   BP Location: Left arm   Patient Position: Sitting   BP Method: Large (Automatic)   Pulse: 76   Temp: 98 °F (36.7 °C)   TempSrc: Temporal   Weight: 85.2 kg (187 lb 13.3 oz)   Height: 5' 10" (1.778 m)     Body mass index is 26.95 kg/m².   ]        Annual wellness visit was done during this visit for the patient.  Paper based questionnaire and assessment completed and documented, and will be scanned to the media section of the [employer] EHR.        Provided Boris with a 5-10 year written screening schedule and personal prevention plan. Recommendations were developed using the USPSTF age appropriate recommendations. Education, counseling, and referrals were provided as needed.  After Visit Summary printed and given to patient which includes a list of additional screenings\tests needed.    No follow-ups on file.      Marcela Walls MD  "